# Patient Record
Sex: MALE | Race: WHITE | Employment: FULL TIME | URBAN - METROPOLITAN AREA
[De-identification: names, ages, dates, MRNs, and addresses within clinical notes are randomized per-mention and may not be internally consistent; named-entity substitution may affect disease eponyms.]

---

## 2017-02-27 ENCOUNTER — OFFICE VISIT (OUTPATIENT)
Dept: FAMILY MEDICINE CLINIC | Facility: CLINIC | Age: 27
End: 2017-02-27

## 2017-02-27 VITALS
BODY MASS INDEX: 29.52 KG/M2 | WEIGHT: 230 LBS | DIASTOLIC BLOOD PRESSURE: 80 MMHG | HEIGHT: 74 IN | HEART RATE: 100 BPM | SYSTOLIC BLOOD PRESSURE: 110 MMHG | RESPIRATION RATE: 20 BRPM | TEMPERATURE: 99 F | OXYGEN SATURATION: 98 %

## 2017-02-27 DIAGNOSIS — J40 BRONCHITIS: ICD-10-CM

## 2017-02-27 DIAGNOSIS — J01.00 ACUTE MAXILLARY SINUSITIS, RECURRENCE NOT SPECIFIED: Primary | ICD-10-CM

## 2017-02-27 PROCEDURE — 99203 OFFICE O/P NEW LOW 30 MIN: CPT | Performed by: NURSE PRACTITIONER

## 2017-02-27 RX ORDER — FLUTICASONE PROPIONATE 50 MCG
2 SPRAY, SUSPENSION (ML) NASAL DAILY
Qty: 1 INHALER | Refills: 0 | Status: SHIPPED | OUTPATIENT
Start: 2017-02-27 | End: 2017-08-15 | Stop reason: ALTCHOICE

## 2017-02-27 RX ORDER — ALBUTEROL SULFATE 90 UG/1
AEROSOL, METERED RESPIRATORY (INHALATION)
Qty: 1 INHALER | Refills: 0 | Status: SHIPPED | OUTPATIENT
Start: 2017-02-27 | End: 2017-08-15 | Stop reason: ALTCHOICE

## 2017-02-27 RX ORDER — AMOXICILLIN 875 MG/1
875 TABLET, COATED ORAL 2 TIMES DAILY
Qty: 20 TABLET | Refills: 0 | Status: SHIPPED | OUTPATIENT
Start: 2017-02-27 | End: 2017-03-09

## 2017-02-27 NOTE — PROGRESS NOTES
CHIEF COMPLAINT:   Patient presents with:  Sinus Problem  Cough      HPI:   Tay Castle is a 27year old male who presents for cold symptoms for  2  weeks. Symptoms have progressed into sinus congestion and been worsening since onset.  Sinus congestio Paternal Grandfather    • Obesity Brother      overwight   • High Cholesterol Maternal Grandfather    • Other [Other] [OTHER] Mother      joint pain (knee)        Smoking Status: Light Tobacco Smoker            Packs/Day: 0.00  Years:         Alcohol Use: 20 tablet 0      Sig: Take 1 tablet (875 mg total) by mouth 2 (two) times daily.       Albuterol Sulfate HFA (PROAIR HFA) 108 (90 Base) MCG/ACT Inhalation Aero Soln 1 Inhaler 0      Si puffs every 4-6 hours as needed      Fluticasone Propionate 50 MCG/A

## 2017-02-27 NOTE — PATIENT INSTRUCTIONS
· Return to clinic or follow up with PCP for further evaluation if symptoms are not improved within 48-72 hours  · Go to ER if facial or periorbital swelling develops  · Humidify the air  · Increase fluid intake  · Steam inhalation and warm compresses ofte you cough, trouble breathing, or coughing up blood   A barking cough that makes it hard to talk   A cough and weight loss that you cannot explain  Is there a test for bronchitis? — People do not usually need a test. But your doctor or nurse might do a test

## 2017-03-07 ENCOUNTER — OFFICE VISIT (OUTPATIENT)
Dept: PHYSICAL THERAPY | Facility: HOSPITAL | Age: 27
End: 2017-03-07
Attending: ORTHOPAEDIC SURGERY
Payer: COMMERCIAL

## 2017-03-07 PROCEDURE — 97110 THERAPEUTIC EXERCISES: CPT

## 2017-03-07 PROCEDURE — 97161 PT EVAL LOW COMPLEX 20 MIN: CPT

## 2017-03-07 NOTE — PROGRESS NOTES
LUMBAR SPINE EVALUATION:   Referring Physician: Dr. Raimundo Singletary  Date of Onset: 3.3.2017 Date of Service: 3/7/2017   Low back pain.     PATIENT SUMMARY:   Robert Davis is a 27year old y/o male who presents to therapy today with complaints of low back p time. Further objective findings as documented below. Denise Gallagher would benefit from skilled Physical Therapy to address the above impairments to decrease pain and increase tolerance of functional mobility in order to achieve below set functional goals. safety at work and to limit risk of future injury. 3. Pt to demonstrate full, pain-free lumbar spine ROM to improve ability to perform transfers without pain. 4. Pt to perform car transfers independently without increased symptoms.      Frequency / Re[place]

## 2017-03-09 ENCOUNTER — OFFICE VISIT (OUTPATIENT)
Dept: PHYSICAL THERAPY | Facility: HOSPITAL | Age: 27
End: 2017-03-09
Attending: ORTHOPAEDIC SURGERY
Payer: COMMERCIAL

## 2017-03-09 PROCEDURE — 97110 THERAPEUTIC EXERCISES: CPT

## 2017-03-09 NOTE — PROGRESS NOTES
Diagnosis: Low back pain  Visit (# authorized):  2 (PPO)      Referring MD(next visit): Dr. Bob Hines (none scheduled)  Precautions:  None  Medication Changes since last visit?: No    Subjective: Pt reports pain 0/10.  Only time pt experiences pain is with si

## 2017-03-09 NOTE — PATIENT INSTRUCTIONS
1. Walk as much as you can throughout your day. 2. Lie on your back knees bent, gently rotate legs side to side. Do 20, twice each day. 3. Lie on your stomach for 5-10 minutes at a time. Do throughout your day. 4. Lie on your back, knees bent.

## 2017-03-15 ENCOUNTER — OFFICE VISIT (OUTPATIENT)
Dept: PHYSICAL THERAPY | Facility: HOSPITAL | Age: 27
End: 2017-03-15
Attending: ORTHOPAEDIC SURGERY
Payer: COMMERCIAL

## 2017-03-15 DIAGNOSIS — M54.50 LOWER BACK PAIN: Primary | ICD-10-CM

## 2017-03-15 PROCEDURE — 97110 THERAPEUTIC EXERCISES: CPT

## 2017-03-15 NOTE — PROGRESS NOTES
Diagnosis: Low back pain  Visit (# authorized):  3 (PPO)      Referring MD(next visit): Dr. Celestina Peace (none scheduled)  Precautions:  None  Medication Changes since last visit?: No    Subjective: Reports RTW Monday this week, 3/13/2017.  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

## 2017-03-21 ENCOUNTER — OFFICE VISIT (OUTPATIENT)
Dept: PHYSICAL THERAPY | Facility: HOSPITAL | Age: 27
End: 2017-03-21
Attending: ORTHOPAEDIC SURGERY
Payer: COMMERCIAL

## 2017-03-21 PROCEDURE — 97110 THERAPEUTIC EXERCISES: CPT

## 2017-03-21 NOTE — PROGRESS NOTES
Diagnosis: Low back pain  Visit (# authorized):  3 (PPO)      Referring MD(next visit): Dr. Fiorella Villeda (none scheduled)  Precautions:  None  Medication Changes since last visit?: No    Subjective: Pt reports that he had some pain last week Friday but he did s

## 2017-03-23 ENCOUNTER — OFFICE VISIT (OUTPATIENT)
Dept: PHYSICAL THERAPY | Facility: HOSPITAL | Age: 27
End: 2017-03-23
Attending: ORTHOPAEDIC SURGERY
Payer: COMMERCIAL

## 2017-03-23 PROCEDURE — 97110 THERAPEUTIC EXERCISES: CPT

## 2017-03-23 NOTE — PROGRESS NOTES
Diagnosis: Low back pain  Visit (# authorized): 4 (PPO)      Referring MD(next visit): Dr. Mic Grady (none scheduled)  Precautions:  None  Medication Changes since last visit?: No    Subjective: Pt states that he is feeling much better.     *patient had to le

## 2017-03-28 ENCOUNTER — OFFICE VISIT (OUTPATIENT)
Dept: PHYSICAL THERAPY | Facility: HOSPITAL | Age: 27
End: 2017-03-28
Attending: ORTHOPAEDIC SURGERY
Payer: COMMERCIAL

## 2017-03-28 PROCEDURE — 97140 MANUAL THERAPY 1/> REGIONS: CPT

## 2017-03-28 PROCEDURE — 97110 THERAPEUTIC EXERCISES: CPT

## 2017-03-28 NOTE — PROGRESS NOTES
Diagnosis: Low back pain  Visit (# authorized): 5 (PPO)      Referring MD(next visit): Dr. Raji Hoffman (none scheduled)  Precautions:  None  Medication Changes since last visit?: No    Subjective: Pt continues to report no pain.  \"Just some occasional tightnes car transfers independently without increased symptoms. MET per pt report              Assessment:  Good response to increased core stabilization exercises.  Reviewed lifting techniques, provided manual mobs to decrease tightness    Plan: Plan to continue s

## 2017-03-30 ENCOUNTER — APPOINTMENT (OUTPATIENT)
Dept: PHYSICAL THERAPY | Facility: HOSPITAL | Age: 27
End: 2017-03-30
Attending: ORTHOPAEDIC SURGERY
Payer: COMMERCIAL

## 2017-04-04 ENCOUNTER — TELEPHONE (OUTPATIENT)
Dept: PHYSICAL THERAPY | Facility: HOSPITAL | Age: 27
End: 2017-04-04

## 2017-04-06 ENCOUNTER — TELEPHONE (OUTPATIENT)
Dept: PHYSICAL THERAPY | Facility: HOSPITAL | Age: 27
End: 2017-04-06

## 2017-04-11 ENCOUNTER — APPOINTMENT (OUTPATIENT)
Dept: PHYSICAL THERAPY | Facility: HOSPITAL | Age: 27
End: 2017-04-11
Attending: ORTHOPAEDIC SURGERY
Payer: COMMERCIAL

## 2017-06-07 NOTE — PROGRESS NOTES
Patient Name: Severo Emmer, : 1990, MRN: [id number]   Date:  2017  Referring Physician: Jignesh Kraus MD      Diagnosis: LBP    Discharge Summary  Progress Note Start Date: 3/7/17       End Date: 3/28/17    Pt seen in PT 5X.      Subjec

## 2017-08-15 ENCOUNTER — OFFICE VISIT (OUTPATIENT)
Dept: INTERNAL MEDICINE CLINIC | Facility: CLINIC | Age: 27
End: 2017-08-15

## 2017-08-15 VITALS
RESPIRATION RATE: 17 BRPM | WEIGHT: 228 LBS | OXYGEN SATURATION: 97 % | TEMPERATURE: 98 F | HEIGHT: 74 IN | DIASTOLIC BLOOD PRESSURE: 64 MMHG | BODY MASS INDEX: 29.26 KG/M2 | HEART RATE: 100 BPM | SYSTOLIC BLOOD PRESSURE: 110 MMHG

## 2017-08-15 DIAGNOSIS — Z01.00 EYE EXAM, ROUTINE: ICD-10-CM

## 2017-08-15 DIAGNOSIS — Z00.00 GENERAL MEDICAL EXAM: Primary | ICD-10-CM

## 2017-08-15 LAB — RUBV IGG SER-ACNC: 158.2 IU/ML

## 2017-08-15 PROCEDURE — 99385 PREV VISIT NEW AGE 18-39: CPT | Performed by: INTERNAL MEDICINE

## 2017-08-15 PROCEDURE — 36415 COLL VENOUS BLD VENIPUNCTURE: CPT | Performed by: INTERNAL MEDICINE

## 2017-08-15 PROCEDURE — 90472 IMMUNIZATION ADMIN EACH ADD: CPT | Performed by: INTERNAL MEDICINE

## 2017-08-15 PROCEDURE — 86480 TB TEST CELL IMMUN MEASURE: CPT | Performed by: INTERNAL MEDICINE

## 2017-08-15 PROCEDURE — 90471 IMMUNIZATION ADMIN: CPT | Performed by: INTERNAL MEDICINE

## 2017-08-15 PROCEDURE — 86765 RUBEOLA ANTIBODY: CPT | Performed by: INTERNAL MEDICINE

## 2017-08-15 PROCEDURE — 86787 VARICELLA-ZOSTER ANTIBODY: CPT | Performed by: INTERNAL MEDICINE

## 2017-08-15 PROCEDURE — 90746 HEPB VACCINE 3 DOSE ADULT IM: CPT | Performed by: INTERNAL MEDICINE

## 2017-08-15 PROCEDURE — 86762 RUBELLA ANTIBODY: CPT | Performed by: INTERNAL MEDICINE

## 2017-08-15 PROCEDURE — 90715 TDAP VACCINE 7 YRS/> IM: CPT | Performed by: INTERNAL MEDICINE

## 2017-08-15 PROCEDURE — 86735 MUMPS ANTIBODY: CPT | Performed by: INTERNAL MEDICINE

## 2017-08-15 NOTE — PROGRESS NOTES
Non fasting blood draw administered in RIGHT arm   Draw successful   Fabián:  Morenita Jaime., Varicella, MMR  D. O.B verified   Ordering Dr: Yvrose Mcwilliams    Vaccination administered in Right Arm IM   Patient tolerated well no reaction at this time, minimal blood at injectio

## 2017-08-16 LAB
MEV IGG SER-ACNC: >300 AU/ML (ref 30–?)
MUV IGG SER IA-ACNC: 280 AU/ML (ref 11–?)
VZV IGG SER IA-ACNC: >4000 (ref 165–?)

## 2017-08-16 NOTE — PROGRESS NOTES
Yesenia Lopez is a 27year old male who presents for school  physical exam.     HPI:   Pt has no major complaints. He is enrolling in EMT classes.         No results found for: CHOLEST, HDL, LDL, TRIGLY, AST, ALT     No current outpatient prescripti asthma    EXAM:   /64 (BP Location: Right arm, Patient Position: Sitting, Cuff Size: adult)   Pulse 100   Temp 98.1 °F (36.7 °C) (Oral)   Resp 17   Ht 74\"   Wt 228 lb   SpO2 97%   BMI 29.27 kg/m²   Body mass index is 29.27 kg/m².    GENERAL: well dev

## 2017-08-18 LAB
M TB IFN-G CD4+ BCKGRND COR BLD-ACNC: 0.01 IU/ML
M TB IFN-G CD4+ T-CELLS BLD-ACNC: 0.03 IU/ML
M TB TUBERC IFN-G BLD QL: NEGATIVE
M TB TUBERC IGNF/MITOGEN IGNF CONTROL: >10 IU/ML

## 2017-09-18 ENCOUNTER — NURSE ONLY (OUTPATIENT)
Dept: INTERNAL MEDICINE CLINIC | Facility: CLINIC | Age: 27
End: 2017-09-18

## 2017-09-18 DIAGNOSIS — Z23 NEED FOR HEPATITIS B VACCINATION: ICD-10-CM

## 2017-09-18 DIAGNOSIS — Z23 NEED FOR INFLUENZA VACCINATION: Primary | ICD-10-CM

## 2017-09-18 PROCEDURE — 90686 IIV4 VACC NO PRSV 0.5 ML IM: CPT | Performed by: INTERNAL MEDICINE

## 2017-09-18 PROCEDURE — 90471 IMMUNIZATION ADMIN: CPT | Performed by: INTERNAL MEDICINE

## 2017-09-18 PROCEDURE — 90472 IMMUNIZATION ADMIN EACH ADD: CPT | Performed by: INTERNAL MEDICINE

## 2017-09-18 PROCEDURE — 90746 HEPB VACCINE 3 DOSE ADULT IM: CPT | Performed by: INTERNAL MEDICINE

## 2017-09-18 NOTE — PROGRESS NOTES
Hep B  injection/vaccine  1 mL administered IM  to the RT Deltoid. Vaccine/injection ordered by physician and documented within chart. Per physician able to administer vaccine/injection. Pt tolerated well. VIS provided and pt had no further questions.

## 2017-10-07 ENCOUNTER — OFFICE VISIT (OUTPATIENT)
Dept: OCCUPATIONAL MEDICINE | Age: 27
End: 2017-10-07
Attending: PREVENTIVE MEDICINE

## 2017-10-25 ENCOUNTER — OFFICE VISIT (OUTPATIENT)
Dept: FAMILY MEDICINE CLINIC | Facility: CLINIC | Age: 27
End: 2017-10-25

## 2017-10-25 VITALS
OXYGEN SATURATION: 98 % | WEIGHT: 224 LBS | HEART RATE: 86 BPM | TEMPERATURE: 98 F | RESPIRATION RATE: 18 BRPM | SYSTOLIC BLOOD PRESSURE: 122 MMHG | BODY MASS INDEX: 29 KG/M2 | DIASTOLIC BLOOD PRESSURE: 78 MMHG

## 2017-10-25 DIAGNOSIS — J01.00 ACUTE NON-RECURRENT MAXILLARY SINUSITIS: Primary | ICD-10-CM

## 2017-10-25 PROCEDURE — 99213 OFFICE O/P EST LOW 20 MIN: CPT | Performed by: NURSE PRACTITIONER

## 2017-10-25 RX ORDER — AMOXICILLIN AND CLAVULANATE POTASSIUM 875; 125 MG/1; MG/1
1 TABLET, FILM COATED ORAL 2 TIMES DAILY
Qty: 20 TABLET | Refills: 0 | Status: SHIPPED | OUTPATIENT
Start: 2017-10-25 | End: 2017-11-04

## 2017-10-25 NOTE — PROGRESS NOTES
CHIEF COMPLAINT:   Patient presents with:  URI: for 10 days, now having facial pain pressure. HPI:   Johnny Self is a 24year old male who presents for sinus congestion for  10  days. Symptoms have been worsening since onset.  Sinus congestion/p LUNGS: denies shortness of breath or wheezing, See HPI  CARDIOVASCULAR: denies chest pain or palpitations   GI: denies N/V/C or abdominal pain  NEURO: + sinus headaches. No numbness or tingling in face.     EXAM:   /78   Pulse 86   Temp 97.8 °F (36.6 · Humidify the air.   Steam inhalation and warm compresses often help relieve pressure  · Increase fluid intake o thin secretions  · For coughing at night, sleep with head of bed elevated; try tea with honey  · Avoid use of antihistamines unless disease was Risks, benefits, side effects of medication addressed and explained. Patient Instructions       Acute Bacterial Rhinosinusitis (ABRS)    Acute bacterial rhinosinusitis (ABRS) is an infection of your nasal cavity and sinuses. It’s caused by bacteria.  Acu · Antibiotic medicine. This is for symptoms that last for at least 10 to 14 days. · Nasal corticosteroid medicine. Drops or spray used in the nose can lessen swelling and congestion. · Over-the-counter pain medicine.  This is to lessen sinus pain and pres

## 2017-10-25 NOTE — PATIENT INSTRUCTIONS
Acute Bacterial Rhinosinusitis (ABRS)    Acute bacterial rhinosinusitis (ABRS) is an infection of your nasal cavity and sinuses. It’s caused by bacteria. Acute means that you’ve had symptoms for less than 4 weeks, but possibly up to 12 weeks.   Understand · Nasal corticosteroid medicine. Drops or spray used in the nose can lessen swelling and congestion. · Over-the-counter pain medicine. This is to lessen sinus pain and pressure. · Nasal decongestant medicine. Spray or drops may help to lessen congestion.

## 2018-01-01 ENCOUNTER — HOSPITAL ENCOUNTER (OUTPATIENT)
Age: 28
Discharge: HOME | End: 2018-01-01
Attending: EMERGENCY MEDICINE
Payer: COMMERCIAL

## 2018-01-01 VITALS
TEMPERATURE: 97 F | BODY MASS INDEX: 28.23 KG/M2 | HEIGHT: 74 IN | RESPIRATION RATE: 18 BRPM | OXYGEN SATURATION: 100 % | WEIGHT: 220 LBS | DIASTOLIC BLOOD PRESSURE: 64 MMHG | HEART RATE: 82 BPM | SYSTOLIC BLOOD PRESSURE: 122 MMHG

## 2018-01-01 DIAGNOSIS — J40 BRONCHITIS: Primary | ICD-10-CM

## 2018-01-01 PROCEDURE — 99213 OFFICE O/P EST LOW 20 MIN: CPT

## 2018-01-01 PROCEDURE — 99204 OFFICE O/P NEW MOD 45 MIN: CPT

## 2018-01-01 RX ORDER — PROMETHAZINE HYDROCHLORIDE AND CODEINE PHOSPHATE 6.25; 1 MG/5ML; MG/5ML
5 SYRUP ORAL EVERY 6 HOURS PRN
Qty: 100 ML | Refills: 0 | Status: SHIPPED | OUTPATIENT
Start: 2018-01-01 | End: 2019-06-24 | Stop reason: ALTCHOICE

## 2018-01-01 RX ORDER — AZITHROMYCIN 250 MG/1
TABLET, FILM COATED ORAL
Qty: 1 PACKAGE | Refills: 0 | Status: SHIPPED | OUTPATIENT
Start: 2018-01-01 | End: 2018-01-06

## 2018-01-01 NOTE — ED PROVIDER NOTES
Patient presents with:  Cough/URI      HPI:     Braxton Whiting is a 24year old male who presents for evaluation of a chief complaint of  a cough Onset of symptoms was 3 days ago. The patient also complains of fever and body aches.   Care prior to arriva Placed This Encounter      azithromycin (ZITHROMAX Z-EDDIE) 250 MG Oral Tab          Si mg once followed by 250 mg daily x 4 days          Dispense:  1 Package          Refill:  0      promethazine-codeine 6.25-10 MG/5ML Oral Syrup          Sig: Take 5

## 2018-01-01 NOTE — ED INITIAL ASSESSMENT (HPI)
PATIENT ARRIVED AMBULATORY TO ROOM C/O A CONGESTED COUGH THAT STARTED 3 DAYS AGO. +NASAL CONGESTION. +BODY ACHES. NO N/V/D. NO FEVERS. EASY NON LABORED RESPIRATIONS.

## 2018-06-13 ENCOUNTER — OFFICE VISIT (OUTPATIENT)
Dept: OTHER | Facility: HOSPITAL | Age: 28
End: 2018-06-13
Attending: EMERGENCY MEDICINE

## 2018-06-13 DIAGNOSIS — Z00.00 ROUTINE GENERAL MEDICAL EXAMINATION AT A HEALTH CARE FACILITY: Primary | ICD-10-CM

## 2018-06-13 PROCEDURE — 86480 TB TEST CELL IMMUN MEASURE: CPT

## 2018-12-02 ENCOUNTER — HOSPITAL ENCOUNTER (OUTPATIENT)
Age: 28
Discharge: HOME | End: 2018-12-02
Attending: EMERGENCY MEDICINE
Payer: COMMERCIAL

## 2018-12-02 VITALS
OXYGEN SATURATION: 97 % | RESPIRATION RATE: 20 BRPM | DIASTOLIC BLOOD PRESSURE: 80 MMHG | TEMPERATURE: 98 F | BODY MASS INDEX: 32.08 KG/M2 | HEIGHT: 74 IN | WEIGHT: 250 LBS | SYSTOLIC BLOOD PRESSURE: 150 MMHG | HEART RATE: 87 BPM

## 2018-12-02 DIAGNOSIS — R21 SKIN RASH: Primary | ICD-10-CM

## 2018-12-02 PROCEDURE — 99213 OFFICE O/P EST LOW 20 MIN: CPT

## 2018-12-02 PROCEDURE — 80047 BASIC METABLC PNL IONIZED CA: CPT

## 2018-12-02 PROCEDURE — 85025 COMPLETE CBC W/AUTO DIFF WBC: CPT | Performed by: EMERGENCY MEDICINE

## 2018-12-02 PROCEDURE — 36415 COLL VENOUS BLD VENIPUNCTURE: CPT

## 2018-12-02 PROCEDURE — 99214 OFFICE O/P EST MOD 30 MIN: CPT

## 2018-12-02 RX ORDER — PREDNISONE 20 MG/1
40 TABLET ORAL ONCE
Status: COMPLETED | OUTPATIENT
Start: 2018-12-02 | End: 2018-12-02

## 2018-12-02 RX ORDER — FAMOTIDINE 20 MG/1
20 TABLET ORAL ONCE
Status: COMPLETED | OUTPATIENT
Start: 2018-12-02 | End: 2018-12-02

## 2018-12-02 RX ORDER — CEFADROXIL 500 MG/1
500 CAPSULE ORAL 2 TIMES DAILY
Qty: 14 CAPSULE | Refills: 0 | Status: SHIPPED | OUTPATIENT
Start: 2018-12-02 | End: 2018-12-09

## 2018-12-02 RX ORDER — PREDNISONE 20 MG/1
40 TABLET ORAL DAILY
Qty: 10 TABLET | Refills: 0 | Status: SHIPPED | OUTPATIENT
Start: 2018-12-03 | End: 2018-12-08

## 2018-12-02 RX ORDER — DIPHENHYDRAMINE HCL 50 MG
50 CAPSULE ORAL EVERY 6 HOURS PRN
COMMUNITY
End: 2019-06-24 | Stop reason: ALTCHOICE

## 2018-12-02 NOTE — ED NOTES
POCT CBC results did not cross over.  Results entered manually  Results reviewed by Dr Meghan Hoang      WBC 6.9  RBC  5.03  HGB  14.8  HCT  43.3  MCV  86.1  MCH  29.4  MCHC  34.2  PLT  201  LYM%  36.7  MXD%  6.8    NEUT% 56.5  LYM#  2.5  MXD#  0.5  NEUT#  3.9

## 2018-12-02 NOTE — ED INITIAL ASSESSMENT (HPI)
Pt to IC with itching sensation to feet bilaterally. States started last evening.  Taking Benadryl and applying \"anti-itch cream\" without relief

## 2018-12-02 NOTE — ED PROVIDER NOTES
Patient Seen in: [facility]    History   Patient presents with:  Rash Skin Problem (integumentary)    Stated Complaint: itchy  bilateral foot    HPI    The patient complains of bilateral foot pruritus with pain which occu equal reactive sclera nonicteric  ENT On exam of the throat there is no pharyngeal erythema or exudate  Neck is nontender to palpation without adenopathy  Lungs are clear to auscultation  Cardiac there are normal first and second heart sounds  Abdomen is s Henri Power MD  [email or web address] [phone]    In 2 days  if symptoms do not improve        Medications Prescribed:  Discharge Medication List as of 12/2/2018  1:08 PM    START taking these medications    predniSONE 20

## 2019-06-24 ENCOUNTER — OFFICE VISIT (OUTPATIENT)
Dept: FAMILY MEDICINE CLINIC | Facility: CLINIC | Age: 29
End: 2019-06-24

## 2019-06-24 VITALS
SYSTOLIC BLOOD PRESSURE: 126 MMHG | HEART RATE: 78 BPM | BODY MASS INDEX: 34.67 KG/M2 | DIASTOLIC BLOOD PRESSURE: 84 MMHG | OXYGEN SATURATION: 97 % | WEIGHT: 256 LBS | HEIGHT: 72 IN

## 2019-06-24 DIAGNOSIS — E66.09 CLASS 1 OBESITY DUE TO EXCESS CALORIES WITH SERIOUS COMORBIDITY AND BODY MASS INDEX (BMI) OF 34.0 TO 34.9 IN ADULT: ICD-10-CM

## 2019-06-24 DIAGNOSIS — Z87.891 FORMER SMOKER: ICD-10-CM

## 2019-06-24 DIAGNOSIS — Z02.0 SCHOOL PHYSICAL EXAM: Primary | ICD-10-CM

## 2019-06-24 DIAGNOSIS — Z01.89 ENCOUNTER FOR ROUTINE LABORATORY TESTING: ICD-10-CM

## 2019-06-24 PROCEDURE — 99385 PREV VISIT NEW AGE 18-39: CPT

## 2019-06-25 PROBLEM — Z87.891 FORMER SMOKER: Status: ACTIVE | Noted: 2019-06-25

## 2019-06-25 PROBLEM — E66.09 CLASS 1 OBESITY DUE TO EXCESS CALORIES WITH SERIOUS COMORBIDITY AND BODY MASS INDEX (BMI) OF 34.0 TO 34.9 IN ADULT: Status: ACTIVE | Noted: 2019-06-25

## 2019-06-25 PROBLEM — Z02.0 SCHOOL PHYSICAL EXAM: Status: ACTIVE | Noted: 2019-06-25

## 2019-06-26 NOTE — PROGRESS NOTES
Juanjose Key is a 34year old male with no significant past medical hx, who presents for school physical. Patient has no concerns at this time. No current outpatient medications on file.     PAST MEDICAL HISTORY: Denies any history of asthma or a back is not tender and FROM of the back, no evidence of scoliosis  EXTREMITIES: no deformity, no swelling  NEURO: Oriented times three, cranial nerves are intact and motor and sensory are grossly intact    ASSESSMENT AND PLAN:  Severo Emmer is a 34 ye

## (undated) NOTE — MR AVS SNAPSHOT
Brendan Skinner [address]  [facility] [address]  [phone]               Thank you for choosing us for your health care visit with Derek Craig PTA.   We are glad to serve you and happy to provide you with [phone]           Please arrive at your scheduled appointment time. Wear comfortable, loose fitting clothing.             Apr 11, 2017  6:45 PM   [place] [facility] Visit By Therapist with Pablito Guajardo, [facility]

## (undated) NOTE — MR AVS SNAPSHOT
Brendan Skinner [address]  [facility] [address]  [phone]               Thank you for choosing us for your health care visit with Nubia Negrete PTA.   We are glad to serve you and happy to provide you with [address]   [phone]           Please arrive at your scheduled appointment time. Wear comfortable, loose fitting clothing. MyChart     Sign up for [employer], your secure online medical record.   MyChart will all

## (undated) NOTE — MR AVS SNAPSHOT
Brendan Skinner 12 [address] [facility] [address] [facility]  [phone]  [phone]               Thank you for choosing us for your health care visit with Shaina Costello PT.   We are glad to serve you and happy to provide you with [phone]           Please arrive at your scheduled appointment time. Wear comfortable, loose fitting clothing.             Mar 30, 2017  6:45 PM   [place] Physical Therapy Visit By Therapist with Parish Dimas, [address]

## (undated) NOTE — MR AVS SNAPSHOT
Brendan Skinner [address]  [facility] [address]  [phone]               Thank you for choosing us for your health care visit with Cat Hermosillo PTA.   We are glad to serve you and happy to provide you with [phone]           Please arrive at your scheduled appointment time. Wear comfortable, loose fitting clothing.             Apr 06, 2017  6:00 PM   [place] Physical Therapy Visit By Therapist with Sanjiv Medina, [facility]

## (undated) NOTE — MR AVS SNAPSHOT
Brendan Skinner [address]  [facility] [address]  [phone]               Thank you for choosing us for your health care visit with Miguel Ángel Fulton PT.   We are glad to serve you and happy to provide you wit [phone]           Please arrive at your scheduled appointment time. Wear comfortable, loose fitting clothing.             Mar 23, 2017  6:00 PM   [place] Physical Therapy Visit By Therapist with Yvrose Esparza, \[facility]\""   [address]

## (undated) NOTE — MR AVS SNAPSHOT
Brendan Skinner [address]  [facility] [address]  [phone]  [phone]               Thank you for choosing us for your health care visit with Haider Smith PT.   We are glad to serve you and happy to provide you wit [place] Physical Therapy Visit By Therapist with Kristin Horowitz, ROBB   [address] [facility] ([address] [place] [address])    [address]   [phone]           Please arrive at your scheduled appointment Expires: 4/28/2017  3:55 PM    If you have questions, you can call [phone] to talk to our [facility] Staff. Remember, [employer] is NOT to be used for urgent needs. For medical emergencies, dial 911.

## (undated) NOTE — LETTER
Dr. Aida Castillo MD     [address] [address]     July 29, 2019      [address] [facility]      Dear Mr. Williamson

## (undated) NOTE — MR AVS SNAPSHOT
[facility] [address]  [phone]               Thank you for choosing us for your health care visit with MARY Engle.   We are glad to serve you and happy to provide you with this summary of your Bronchitis can also happen when a person gets an infection called “whooping cough,” but this is not as common. Whooping cough is caused by bacteria that can infect the bronchi.  Most people get vaccines that prevent whooping cough, but the vaccine doesn’t a of your body. One of the best ways to do this is to wash your hands often with soap and water. If there is no sink nearby, you can use a hand gel with alcohol in it to clean your hands.    How can I keep from spreading my germs? — In addition to washing you visit,  view other health information, and more. To sign up or find more information, go to [email or web address] and click on the Sign Up Now link in the [employer] box.      Enter your [employer] Activation Code exactly as it appears below along with yo Don’t forget strength training with weights and resistance Set goals and track your progress   You don’t need to join a gym. Home exercises work great.  Put more priority on exercise in your life                    Visit [facility] online at